# Patient Record
Sex: FEMALE | Race: BLACK OR AFRICAN AMERICAN | NOT HISPANIC OR LATINO | Employment: UNEMPLOYED | RURAL
[De-identification: names, ages, dates, MRNs, and addresses within clinical notes are randomized per-mention and may not be internally consistent; named-entity substitution may affect disease eponyms.]

---

## 2024-09-06 ENCOUNTER — HOSPITAL ENCOUNTER (EMERGENCY)
Facility: HOSPITAL | Age: 44
Discharge: HOME | End: 2024-09-06
Payer: COMMERCIAL

## 2024-09-06 VITALS
OXYGEN SATURATION: 100 % | DIASTOLIC BLOOD PRESSURE: 68 MMHG | SYSTOLIC BLOOD PRESSURE: 115 MMHG | TEMPERATURE: 98 F | RESPIRATION RATE: 16 BRPM | WEIGHT: 200 LBS | HEART RATE: 77 BPM | BODY MASS INDEX: 33.32 KG/M2 | HEIGHT: 65 IN

## 2024-09-06 DIAGNOSIS — R56.9 SEIZURE: ICD-10-CM

## 2024-09-06 LAB
ALBUMIN SERPL BCP-MCNC: 3.3 G/DL (ref 3.5–5)
ALBUMIN/GLOB SERPL: 0.8 {RATIO}
ALP SERPL-CCNC: 75 U/L (ref 37–98)
ALT SERPL W P-5'-P-CCNC: 22 U/L (ref 13–56)
ANION GAP SERPL CALCULATED.3IONS-SCNC: 13 MMOL/L (ref 7–16)
AST SERPL W P-5'-P-CCNC: 19 U/L (ref 15–37)
BASOPHILS # BLD AUTO: 0.01 K/UL (ref 0–0.2)
BASOPHILS NFR BLD AUTO: 0.1 % (ref 0–1)
BILIRUB SERPL-MCNC: 0.6 MG/DL (ref ?–1.2)
BUN SERPL-MCNC: 10 MG/DL (ref 7–18)
BUN/CREAT SERPL: 10 (ref 6–20)
CALCIUM SERPL-MCNC: 8.8 MG/DL (ref 8.5–10.1)
CHLORIDE SERPL-SCNC: 103 MMOL/L (ref 98–107)
CO2 SERPL-SCNC: 28 MMOL/L (ref 21–32)
CREAT SERPL-MCNC: 1.04 MG/DL (ref 0.55–1.02)
DIFFERENTIAL METHOD BLD: ABNORMAL
EGFR (NO RACE VARIABLE) (RUSH/TITUS): 69 ML/MIN/1.73M2
EOSINOPHIL # BLD AUTO: 0.07 K/UL (ref 0–0.5)
EOSINOPHIL NFR BLD AUTO: 0.9 % (ref 1–4)
ERYTHROCYTE [DISTWIDTH] IN BLOOD BY AUTOMATED COUNT: 13.7 % (ref 11.5–14.5)
GLOBULIN SER-MCNC: 4.4 G/DL (ref 2–4)
GLUCOSE SERPL-MCNC: 94 MG/DL (ref 74–106)
HCT VFR BLD AUTO: 38.8 % (ref 38–47)
HGB BLD-MCNC: 12.6 G/DL (ref 12–16)
LYMPHOCYTES # BLD AUTO: 2.28 K/UL (ref 1–4.8)
LYMPHOCYTES NFR BLD AUTO: 28.6 % (ref 27–41)
MCH RBC QN AUTO: 29 PG (ref 27–31)
MCHC RBC AUTO-ENTMCNC: 32.5 G/DL (ref 32–36)
MCV RBC AUTO: 89.4 FL (ref 80–96)
MONOCYTES # BLD AUTO: 0.82 K/UL (ref 0–0.8)
MONOCYTES NFR BLD AUTO: 10.3 % (ref 2–6)
MPC BLD CALC-MCNC: 9.3 FL (ref 9.4–12.4)
NEUTROPHILS # BLD AUTO: 4.8 K/UL (ref 1.8–7.7)
NEUTROPHILS NFR BLD AUTO: 60.1 % (ref 53–65)
PLATELET # BLD AUTO: 277 K/UL (ref 150–400)
POTASSIUM SERPL-SCNC: 3.9 MMOL/L (ref 3.5–5.1)
PROT SERPL-MCNC: 7.7 G/DL (ref 6.4–8.2)
RBC # BLD AUTO: 4.34 M/UL (ref 4.2–5.4)
SODIUM SERPL-SCNC: 140 MMOL/L (ref 136–145)
WBC # BLD AUTO: 7.98 K/UL (ref 4.5–11)

## 2024-09-06 PROCEDURE — 96375 TX/PRO/DX INJ NEW DRUG ADDON: CPT

## 2024-09-06 PROCEDURE — 80053 COMPREHEN METABOLIC PANEL: CPT | Performed by: NURSE PRACTITIONER

## 2024-09-06 PROCEDURE — 63600175 PHARM REV CODE 636 W HCPCS: Performed by: NURSE PRACTITIONER

## 2024-09-06 PROCEDURE — 96374 THER/PROPH/DIAG INJ IV PUSH: CPT

## 2024-09-06 PROCEDURE — 99284 EMERGENCY DEPT VISIT MOD MDM: CPT | Mod: 25

## 2024-09-06 PROCEDURE — 99284 EMERGENCY DEPT VISIT MOD MDM: CPT | Mod: ,,, | Performed by: NURSE PRACTITIONER

## 2024-09-06 PROCEDURE — 93005 ELECTROCARDIOGRAM TRACING: CPT

## 2024-09-06 PROCEDURE — 93010 ELECTROCARDIOGRAM REPORT: CPT | Mod: ,,, | Performed by: INTERNAL MEDICINE

## 2024-09-06 PROCEDURE — 85025 COMPLETE CBC W/AUTO DIFF WBC: CPT | Performed by: NURSE PRACTITIONER

## 2024-09-06 RX ORDER — SERTRALINE HYDROCHLORIDE 100 MG/1
100 TABLET, FILM COATED ORAL DAILY
COMMUNITY

## 2024-09-06 RX ORDER — FERROUS SULFATE 324(65)MG
65 TABLET, DELAYED RELEASE (ENTERIC COATED) ORAL
COMMUNITY

## 2024-09-06 RX ORDER — MONTELUKAST SODIUM 10 MG/1
1 TABLET ORAL NIGHTLY
COMMUNITY

## 2024-09-06 RX ORDER — FLUTICASONE PROPIONATE AND SALMETEROL 100; 50 UG/1; UG/1
1 POWDER RESPIRATORY (INHALATION) 2 TIMES DAILY
COMMUNITY

## 2024-09-06 RX ORDER — LEVETIRACETAM 500 MG/5ML
1000 INJECTION, SOLUTION, CONCENTRATE INTRAVENOUS
Status: COMPLETED | OUTPATIENT
Start: 2024-09-06 | End: 2024-09-06

## 2024-09-06 RX ORDER — LEVETIRACETAM 100 MG/ML
19.85 SOLUTION ORAL 2 TIMES DAILY
Status: DISCONTINUED | OUTPATIENT
Start: 2024-09-06 | End: 2024-09-06

## 2024-09-06 RX ORDER — OMEPRAZOLE AND SODIUM BICARBONATE 40; 1100 MG/1; MG/1
1 CAPSULE ORAL
COMMUNITY

## 2024-09-06 RX ORDER — LEVETIRACETAM 1000 MG/1
1000 TABLET ORAL
COMMUNITY

## 2024-09-06 RX ORDER — ALBUTEROL SULFATE 1.25 MG/3ML
1.25 SOLUTION RESPIRATORY (INHALATION) EVERY 6 HOURS PRN
COMMUNITY

## 2024-09-06 RX ORDER — ONDANSETRON HYDROCHLORIDE 2 MG/ML
4 INJECTION, SOLUTION INTRAVENOUS
Status: COMPLETED | OUTPATIENT
Start: 2024-09-06 | End: 2024-09-06

## 2024-09-06 RX ADMIN — ONDANSETRON 4 MG: 2 INJECTION INTRAMUSCULAR; INTRAVENOUS at 08:09

## 2024-09-06 RX ADMIN — LEVETIRACETAM 1000 MG: 100 INJECTION, SOLUTION INTRAVENOUS at 08:09

## 2024-09-06 NOTE — ED NOTES
"Tried to call the pt's mother again at [phone] and the line goes straight to recording stating "voicemail has not been set up yet".   "

## 2024-09-06 NOTE — ED PROVIDER NOTES
Encounter Date: 9/6/2024       History     Chief Complaint   Patient presents with    Seizures     Reports seizure-like activity this morning after two days off of seizure medications.      42 yo AAF to ED after witnessed seizure activity at home. Admits to missing 2 doses of her Keppra d/t being away from home. Not postictal currently. Complains of headache, denies any head trauma.     The history is provided by the patient.     Review of patient's allergies indicates:   Allergen Reactions    Gabapentin Anaphylaxis    Meloxicam Anaphylaxis and Hives    Penicillins Anaphylaxis and Hives    Pregabalin Anaphylaxis    Sulfa (sulfonamide antibiotics) Anaphylaxis and Hives     Past Medical History:   Diagnosis Date    Depression     Hypertension     Seizures      Past Surgical History:   Procedure Laterality Date    HYSTERECTOMY       No family history on file.  Social History     Tobacco Use    Smoking status: Some Days     Current packs/day: 0.50     Types: Cigarettes    Smokeless tobacco: Never   Substance Use Topics    Alcohol use: Not Currently    Drug use: Never     Review of Systems   Constitutional:  Negative for chills and fever.   Respiratory:  Negative for cough, shortness of breath and wheezing.    Gastrointestinal:  Positive for nausea and vomiting. Negative for abdominal pain.   Musculoskeletal:  Negative for back pain, neck pain and neck stiffness.   Neurological:  Positive for seizures and headaches. Negative for dizziness and weakness.   Psychiatric/Behavioral:  Negative for confusion.    All other systems reviewed and are negative.      Physical Exam     Initial Vitals [09/06/24 0800]   BP Pulse Resp Temp SpO2   114/86 87 16 98.1 °F (36.7 °C) 100 %      MAP       --         Physical Exam    Nursing note and vitals reviewed.  Constitutional: Vital signs are normal. She appears well-developed and well-nourished. She is Obese .   HENT:   Head: Normocephalic and atraumatic.   Right Ear: External ear normal.    Left Ear: External ear normal.   Nose: Nose normal.   Mouth/Throat: Oropharynx is clear and moist.   Eyes: EOM are normal. Pupils are equal, round, and reactive to light.   Neck: Neck supple.   Normal range of motion.  Cardiovascular:  Normal rate, regular rhythm and normal heart sounds.           Pulmonary/Chest: Breath sounds normal.   Abdominal: Abdomen is soft. She exhibits no distension. There is no abdominal tenderness.   Musculoskeletal:         General: Normal range of motion.      Cervical back: Normal range of motion and neck supple.     Neurological: She is alert and oriented to person, place, and time. She has normal strength. GCS score is 15. GCS eye subscore is 4. GCS verbal subscore is 5. GCS motor subscore is 6.   Skin: Skin is warm. Capillary refill takes less than 2 seconds.   Psychiatric: She has a normal mood and affect. Her behavior is normal. Judgment and thought content normal.         Medical Screening Exam   See Full Note    ED Course   Procedures  Labs Reviewed   COMPREHENSIVE METABOLIC PANEL - Abnormal       Result Value    Sodium 140      Potassium 3.9      Chloride 103      CO2 28      Anion Gap 13      Glucose 94      BUN 10      Creatinine 1.04 (*)     BUN/Creatinine Ratio 10      Calcium 8.8      Total Protein 7.7      Albumin 3.3 (*)     Globulin 4.4 (*)     A/G Ratio 0.8      Bilirubin, Total 0.6      Alk Phos 75      ALT 22      AST 19      eGFR 69     CBC WITH DIFFERENTIAL - Abnormal    WBC 7.98      RBC 4.34      Hemoglobin 12.6      Hematocrit 38.8      MCV 89.4      MCH 29.0      MCHC 32.5      RDW 13.7      Platelet Count 277      MPV 9.3 (*)     Neutrophils % 60.1      Lymphocytes % 28.6      Neutrophils, Abs 4.80      Lymphocytes, Absolute 2.28      Diff Type Auto      Monocytes % 10.3 (*)     Eosinophils % 0.9 (*)     Basophils % 0.1      Monocytes, Absolute 0.82 (*)     Eosinophils, Absolute 0.07      Basophils, Absolute 0.01     CBC W/ AUTO DIFFERENTIAL    Narrative:      The following orders were created for panel order CBC auto differential.  Procedure                               Abnormality         Status                     ---------                               -----------         ------                     CBC with Differential[[id number]]       Abnormal            Final result                 Please view results for these tests on the individual orders.          Imaging Results    None          Medications   levETIRAcetam injection 1,000 mg (has no administration in time range)   ondansetron injection 4 mg (4 mg Intravenous Given 9/6/24 0822)     Medical Decision Making  44 yo AAF to ED after witnessed seizure activity at home. Admits to missing 2 doses of her Keppra d/t being away from home. Not postictal currently. Complains of headache, denies any head trauma. Reports episode of vomiting after seizure.    The history is provided by the patient.   Vitals reviewed  Labs reviewed  Patient given Zofran and Keppra IV  Monitored in ED with no further seizure activity.   Discharged to home with instruction to keep extra medication in her purse so she does not miss doses.   Has neurology appointment scheduled for next week.   Strict return and follow-up precautions have been given by me personally to the patient/family/caregiver(s).    Data Reviewed/Counseling: I have reviewed the patient's vital signs, nursing notes, and other relevant tests/information. I had a detailed discussion regarding the historical points, exam findings, and any diagnostic results supporting the discharge diagnosis. I also discussed the need for outpatient follow-up and the need to return to the ED if symptoms worsen or if there are any questions or concerns that arise at home.      Strict return and follow-up precautions have been given by me personally to the patient/family/caregiver(s).    Data Reviewed/Counseling: I have reviewed the patient's vital signs, nursing notes, and other relevant  tests/information. I had a detailed discussion regarding the historical points, exam findings, and any diagnostic results supporting the discharge diagnosis. I also discussed the need for outpatient follow-up and the need to return to the ED if symptoms worsen or if there are any questions or concerns that arise at home.      Amount and/or Complexity of Data Reviewed  Labs: ordered.    Risk  Prescription drug management.                                      Clinical Impression:   Final diagnoses:  [R56.9] Seizure        ED Disposition Condition    Discharge Stable          ED Prescriptions    None       Follow-up Information    None                 Clara Anderson, ED  09/06/24 0843

## 2024-09-06 NOTE — ED NOTES
Attempted to call pt's mother for transportation upon discharge. Unable to contact anyone and line has no voicemail set up. Pt left in room until transport arrives. Pt is attempting to call mother as well.

## 2024-09-06 NOTE — DISCHARGE INSTRUCTIONS
Make sure you keep extra medication in your purse so you do not miss doses. Keep neurology appointment. Follow up with your primary care provider as needed. Return to the ER for worsening symptoms.     The examination and treatment you have received in the Emergency Department today have been rendered on an emergency basis only and are not intended to be a substitute for an effort to provide complete medical care. You should contact your follow-up physician as it is important that you let him or her check you and report any new or remaining problems since it is impossible to recognize and treat all elements of an injury or illness in a single emergency care center visit.

## 2024-09-09 LAB
OHS QRS DURATION: 80 MS
OHS QTC CALCULATION: 442 MS

## 2024-10-06 ENCOUNTER — HOSPITAL ENCOUNTER (EMERGENCY)
Facility: HOSPITAL | Age: 44
Discharge: HOME | End: 2024-10-06
Payer: COMMERCIAL

## 2024-10-06 VITALS
BODY MASS INDEX: 33.32 KG/M2 | HEART RATE: 88 BPM | OXYGEN SATURATION: 99 % | TEMPERATURE: 98 F | SYSTOLIC BLOOD PRESSURE: 108 MMHG | WEIGHT: 200 LBS | DIASTOLIC BLOOD PRESSURE: 59 MMHG | RESPIRATION RATE: 16 BRPM | HEIGHT: 65 IN

## 2024-10-06 DIAGNOSIS — R56.9 SEIZURE-LIKE ACTIVITY: Primary | ICD-10-CM

## 2024-10-06 DIAGNOSIS — R11.2 NAUSEA AND VOMITING, UNSPECIFIED VOMITING TYPE: ICD-10-CM

## 2024-10-06 LAB
ALBUMIN SERPL BCP-MCNC: 3.4 G/DL (ref 3.5–5)
ALBUMIN/GLOB SERPL: 0.8 {RATIO}
ALP SERPL-CCNC: 86 U/L (ref 37–98)
ALT SERPL W P-5'-P-CCNC: 34 U/L (ref 13–56)
ANION GAP SERPL CALCULATED.3IONS-SCNC: 13 MMOL/L (ref 7–16)
AST SERPL W P-5'-P-CCNC: 25 U/L (ref 15–37)
BASOPHILS # BLD AUTO: 0.02 K/UL (ref 0–0.2)
BASOPHILS NFR BLD AUTO: 0.2 % (ref 0–1)
BILIRUB SERPL-MCNC: 0.5 MG/DL (ref ?–1.2)
BUN SERPL-MCNC: 11 MG/DL (ref 7–18)
BUN/CREAT SERPL: 11 (ref 6–20)
CALCIUM SERPL-MCNC: 8.7 MG/DL (ref 8.5–10.1)
CHLORIDE SERPL-SCNC: 103 MMOL/L (ref 98–107)
CO2 SERPL-SCNC: 26 MMOL/L (ref 21–32)
CREAT SERPL-MCNC: 1 MG/DL (ref 0.55–1.02)
DIFFERENTIAL METHOD BLD: ABNORMAL
EGFR (NO RACE VARIABLE) (RUSH/TITUS): 72 ML/MIN/1.73M2
EOSINOPHIL # BLD AUTO: 0.07 K/UL (ref 0–0.5)
EOSINOPHIL NFR BLD AUTO: 0.8 % (ref 1–4)
ERYTHROCYTE [DISTWIDTH] IN BLOOD BY AUTOMATED COUNT: 14.6 % (ref 11.5–14.5)
GLOBULIN SER-MCNC: 4.5 G/DL (ref 2–4)
GLUCOSE SERPL-MCNC: 100 MG/DL (ref 74–106)
GLUCOSE SERPL-MCNC: 90 MG/DL (ref 70–105)
HCT VFR BLD AUTO: 40.4 % (ref 38–47)
HGB BLD-MCNC: 13.2 G/DL (ref 12–16)
INFLUENZA A MOLECULAR (OHS): NEGATIVE
INFLUENZA B MOLECULAR (OHS): NEGATIVE
LACTATE SERPL-SCNC: 1.3 MMOL/L (ref 0.4–2)
LYMPHOCYTES # BLD AUTO: 3.22 K/UL (ref 1–4.8)
LYMPHOCYTES NFR BLD AUTO: 35.8 % (ref 27–41)
MCH RBC QN AUTO: 29.1 PG (ref 27–31)
MCHC RBC AUTO-ENTMCNC: 32.7 G/DL (ref 32–36)
MCV RBC AUTO: 89.2 FL (ref 80–96)
MONOCYTES # BLD AUTO: 0.85 K/UL (ref 0–0.8)
MONOCYTES NFR BLD AUTO: 9.5 % (ref 2–6)
MPC BLD CALC-MCNC: 9.3 FL (ref 9.4–12.4)
NEUTROPHILS # BLD AUTO: 4.83 K/UL (ref 1.8–7.7)
NEUTROPHILS NFR BLD AUTO: 53.7 % (ref 53–65)
PLATELET # BLD AUTO: 335 K/UL (ref 150–400)
POTASSIUM SERPL-SCNC: 4.6 MMOL/L (ref 3.5–5.1)
PROT SERPL-MCNC: 7.9 G/DL (ref 6.4–8.2)
RBC # BLD AUTO: 4.53 M/UL (ref 4.2–5.4)
SARS-COV+SARS-COV-2 AG RESP QL IA.RAPID: NEGATIVE
SODIUM SERPL-SCNC: 137 MMOL/L (ref 136–145)
WBC # BLD AUTO: 8.99 K/UL (ref 4.5–11)

## 2024-10-06 PROCEDURE — 83605 ASSAY OF LACTIC ACID: CPT | Performed by: NURSE PRACTITIONER

## 2024-10-06 PROCEDURE — 96374 THER/PROPH/DIAG INJ IV PUSH: CPT

## 2024-10-06 PROCEDURE — 87426 SARSCOV CORONAVIRUS AG IA: CPT | Performed by: NURSE PRACTITIONER

## 2024-10-06 PROCEDURE — 87502 INFLUENZA DNA AMP PROBE: CPT | Performed by: NURSE PRACTITIONER

## 2024-10-06 PROCEDURE — 99285 EMERGENCY DEPT VISIT HI MDM: CPT | Mod: 25

## 2024-10-06 PROCEDURE — 82962 GLUCOSE BLOOD TEST: CPT

## 2024-10-06 PROCEDURE — 85025 COMPLETE CBC W/AUTO DIFF WBC: CPT | Performed by: NURSE PRACTITIONER

## 2024-10-06 PROCEDURE — 63600175 PHARM REV CODE 636 W HCPCS: Performed by: NURSE PRACTITIONER

## 2024-10-06 PROCEDURE — 80053 COMPREHEN METABOLIC PANEL: CPT | Performed by: NURSE PRACTITIONER

## 2024-10-06 RX ORDER — ONDANSETRON 4 MG/1
4 TABLET, ORALLY DISINTEGRATING ORAL EVERY 6 HOURS PRN
Qty: 3 TABLET | Refills: 0 | Status: SHIPPED | OUTPATIENT
Start: 2024-10-06

## 2024-10-06 RX ORDER — LEVETIRACETAM 500 MG/5ML
500 INJECTION, SOLUTION, CONCENTRATE INTRAVENOUS
Status: COMPLETED | OUTPATIENT
Start: 2024-10-06 | End: 2024-10-06

## 2024-10-06 RX ADMIN — LEVETIRACETAM 500 MG: 100 INJECTION, SOLUTION INTRAVENOUS at 09:10
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
## 2024-10-07 NOTE — ED NOTES
Mitul MILLER notified that first  left pov here and got in the car with sister after arguing with family.
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
## 2024-10-07 NOTE — ED NOTES
Pt reports arguing with s/o pta. Also reports feeling bad for 2-3 days request to be tested for covid & strep.
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
